# Patient Record
Sex: MALE | Race: OTHER
[De-identification: names, ages, dates, MRNs, and addresses within clinical notes are randomized per-mention and may not be internally consistent; named-entity substitution may affect disease eponyms.]

---

## 2018-07-19 ENCOUNTER — HOSPITAL ENCOUNTER (EMERGENCY)
Dept: HOSPITAL 74 - JER | Age: 53
Discharge: HOME | End: 2018-07-19
Payer: SELF-PAY

## 2018-07-19 VITALS — SYSTOLIC BLOOD PRESSURE: 104 MMHG | TEMPERATURE: 97.5 F | DIASTOLIC BLOOD PRESSURE: 73 MMHG | HEART RATE: 66 BPM

## 2018-07-19 VITALS — BODY MASS INDEX: 31.1 KG/M2

## 2018-07-19 DIAGNOSIS — R10.9: Primary | ICD-10-CM

## 2018-07-19 DIAGNOSIS — I10: ICD-10-CM

## 2018-07-19 LAB
ALBUMIN SERPL-MCNC: 3.7 G/DL (ref 3.4–5)
ALP SERPL-CCNC: 70 U/L (ref 45–117)
ALT SERPL-CCNC: 44 U/L (ref 12–78)
ANION GAP SERPL CALC-SCNC: 9 MMOL/L (ref 8–16)
APPEARANCE UR: CLEAR
AST SERPL-CCNC: 29 U/L (ref 15–37)
BASOPHILS # BLD: 0.6 % (ref 0–2)
BILIRUB SERPL-MCNC: 0.4 MG/DL (ref 0.2–1)
BILIRUB UR STRIP.AUTO-MCNC: NEGATIVE MG/DL
BUN SERPL-MCNC: 12 MG/DL (ref 7–18)
CALCIUM SERPL-MCNC: 8.7 MG/DL (ref 8.5–10.1)
CHLORIDE SERPL-SCNC: 106 MMOL/L (ref 98–107)
CO2 SERPL-SCNC: 25 MMOL/L (ref 21–32)
COLOR UR: (no result)
CREAT SERPL-MCNC: 1 MG/DL (ref 0.55–1.02)
DEPRECATED RDW RBC AUTO: 13.8 % (ref 11.6–15.6)
EOSINOPHIL # BLD: 1.2 % (ref 0–4.5)
GLUCOSE SERPL-MCNC: 85 MG/DL (ref 74–106)
HCT VFR BLD CALC: 46.3 % (ref 32.4–45.2)
HGB BLD-MCNC: 15.8 GM/DL (ref 10.7–15.3)
KETONES UR QL STRIP: NEGATIVE
LEUKOCYTE ESTERASE UR QL STRIP.AUTO: NEGATIVE
LIPASE SERPL-CCNC: 104 U/L (ref 73–393)
LYMPHOCYTES # BLD: 35.4 % (ref 8–40)
MCH RBC QN AUTO: 30 PG (ref 25.7–33.7)
MCHC RBC AUTO-ENTMCNC: 34.2 G/DL (ref 32–36)
MCV RBC: 87.7 FL (ref 80–96)
MONOCYTES # BLD AUTO: 18.5 % (ref 3.8–10.2)
NEUTROPHILS # BLD: 44.3 % (ref 42.8–82.8)
NITRITE UR QL STRIP: NEGATIVE
PH UR: 5 [PH] (ref 5–8)
PLATELET # BLD AUTO: 205 K/MM3 (ref 134–434)
PMV BLD: 8.5 FL (ref 7.5–11.1)
POTASSIUM SERPLBLD-SCNC: 4.1 MMOL/L (ref 3.5–5.1)
PROT SERPL-MCNC: 7.1 G/DL (ref 6.4–8.2)
PROT UR QL STRIP: NEGATIVE
PROT UR QL STRIP: NEGATIVE
RBC # BLD AUTO: 5.28 M/MM3 (ref 3.6–5.2)
SODIUM SERPL-SCNC: 140 MMOL/L (ref 136–145)
SP GR UR: 1.01 (ref 1–1.03)
UROBILINOGEN UR STRIP-MCNC: NEGATIVE MG/DL (ref 0.2–1)
WBC # BLD AUTO: 6.1 K/MM3 (ref 4–10)

## 2018-07-19 PROCEDURE — 3E033GC INTRODUCTION OF OTHER THERAPEUTIC SUBSTANCE INTO PERIPHERAL VEIN, PERCUTANEOUS APPROACH: ICD-10-PCS

## 2018-07-19 NOTE — PDOC
History of Present Illness





- General


Chief Complaint: Pain


Stated Complaint: RECTAL BLEED


Time Seen by Provider: 07/19/18 15:42





- History of Present Illness


Initial Comments: 


 52 year old male with history of hypertension and GERD presenting with 

abdominal pain, nausea, and multiple episode of diarrhea over the past three 

days. He believes this started with ingestion of some pork that tasted "off" on 

Tuesday night. Describes his abdominal pain as burning that starts in the 

center of his stomach and goes up to the bottom of his sternum, worse with food

, and better with laying down. He also states that he has had 5 episodes of 

diarrhea per day for the past three days. Admits to blood on toilet paper after 

wiping but no blood int he toilet itself. He has never had this abdominal pain 

before or noticed blood on the toilet paper before. Denies specific chest pain, 

SOB, vomiting, headache, weakness, lightheadedness, cough, or other symptoms.


07/19/18 16:52








Past History





- Past Medical History


Allergies/Adverse Reactions: 


 Allergies











Allergy/AdvReac Type Severity Reaction Status Date / Time


 


No Known Allergies Allergy   Verified 07/19/18 15:50











Home Medications: 


Ambulatory Orders





Famotidine [Pepcid -] 20 mg PO DAILY #30 tablet 07/19/18 


Mag Hydrox/Al Hydrox/Simeth [Mylanta Suspension -] 30 ml PO Q6H PRN #1 bottle 07 /19/18 








COPD: No


HTN: Yes





- Suicide/Smoking/Psychosocial Hx


Smoking History: Never smoked


Have you smoked in the past 12 months: No


Information on smoking cessation initiated: No


Hx Alcohol Use: No


Drug/Substance Use Hx: No


Substance Use Type: None





**Review of Systems





- Review of Systems


Constitutional: No: Chills, Diaphoresis, Fever


HEENTM: No: Blurred Vision, Tearing


Respiratory: No: Cough, Shortness of Breath, Wheezing


Cardiac (ROS): No: Chest Pain, Edema, Irregular Heart Rate


ABD/GI: Yes: Diarrhea, Nausea, Rectal Bleeding.  No: Blood Streaked Bowels, 

Poor Appetite, Vomiting


: No: Burning, Dysuria, Discharge


Musculoskeletal: No: Back Pain, Gout, Joint Pain


Integumentary: No: Flushing, Lesions, Lumps


Neurological: No: Headache, Numbness, Paresthesia


Psychiatric: No: Anxiety, Depression


Hematologic/Lymphatic: No: Anemia, Blood Clots, Easy Bleeding





*Physical Exam





- Vital Signs


 Last Vital Signs











Temp Pulse Resp BP Pulse Ox


 


 97.5 F L  66   18   104/73   99 


 


 07/19/18 15:50  07/19/18 15:50  07/19/18 15:50  07/19/18 15:50  07/19/18 15:50














- Physical Exam


General Appearance: Yes: Nourished, Appropriately Dressed.  No: Apparent 

Distress


HEENT: positive: EOMI, TWILA, Normal ENT Inspection, Normal Voice


Neck: positive: Trachea midline, Normal Thyroid, Supple.  negative: Tender, 

Rigid


Respiratory/Chest: positive: Lungs Clear, Normal Breath Sounds.  negative: 

Chest Tender, Respiratory Distress, Accessory Muscle Use


Cardiovascular: positive: Regular Rhythm, Regular Rate


Gastrointestinal/Abdominal: positive: Flat, Soft.  negative: Tender


Rectal Exam: positive: heme negative stool, normal rectal tone.  negative: 

normal exam (hemorrhoi at the 12 oclock ), melena, decreased tone


Musculoskeletal: positive: Normal Inspection.  negative: Decreased Range of 

Motion


Extremity: positive: Normal Capillary Refill, Normal Inspection, Normal Range 

of Motion.  negative: Tender


Integumentary: positive: Normal Color, Dry, Warm


Neurologic: positive: Fully Oriented, Alert, Normal Mood/Affect, Normal Response

, Motor Strength 5/5





ED Treatment Course





- LABORATORY


CBC & Chemistry Diagram: 


 07/19/18 17:05





 07/19/18 17:05





Medical Decision Making





- Medical Decision Making


52 year old male with PMH of HTN and GERD presenting with burning abdominal pain

, nausea, and diarrhea worsening with food. Patient overall healthy otherwise. 

Symptoms improved with Pepcid and Maalox which corroborates most likely 

diagnosis of GERD/ PUD. Labs WNL. Will DC patient with GI follow up for triple 

therapy vs. endoscopy.


07/19/18 18:14








*DC/Admit/Observation/Transfer


Diagnosis at time of Disposition: 


Abdominal pain


Qualifiers:


 Abdominal location: epigastric Qualified Code(s): R10.13 - Epigastric pain





Diarrhea


Qualifiers:


 Diarrhea type: unspecified type Qualified Code(s): R19.7 - Diarrhea, 

unspecified








- Discharge Dispostion


Disposition: HOME


Condition at time of disposition: Improved


Decision to Admit order: No





- Prescriptions


Prescriptions: 


Famotidine [Pepcid -] 20 mg PO DAILY #30 tablet


Mag Hydrox/Al Hydrox/Simeth [Mylanta Suspension -] 30 ml PO Q6H PRN #1 bottle


 PRN Reason: Dyspepsia





- Referrals


Referrals: 


José Hernandez MD [Staff Physician] - 





- Patient Instructions


Printed Discharge Instructions:  DI for Peptic Ulcer


Additional Instructions: 


Please avoid spicey, hot, and very cold foods. Please avoid alcohol. Please 

follow up with Dr. Hernandez for further evaluation of your stomach pain. Please 

return to the ED if you have new or worsening symptoms.





- Post Discharge Activity
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
## 2018-07-19 NOTE — PDOC
Attending Attestation





- HPI


HPI: 





07/19/18 18:20


52 year old male with no significant past medical history who presents to the 

ED complaining of 3 days of nausea and watery diarrhea. States he ate pork and 

a restaurant prior to the onset of his symptoms. He denies abdominal pain or 

vomiting. He denies any blood per rectum. He denies fever or chills. 





- Physicial Exam


PE: 





07/19/18 18:21


Constitutional: Awake, alert, oriented.  No acute distress.


Head:  Normocephalic.  Atraumatic


Eyes:  PERRL. EOMI.  Conjunctivae are not pale.


ENT:  Mucous membranes are moist and intact. Posterior pharynx without exudates 

or erythema. Uvula midline.


Cardiovascular:  Regular rate.  Regular rhythm. S1, S2 regular.  Distal pulses 

are 2+ and symmetric.  


Pulmonary/Chest:  No evidence of respiratory distress.  Clear to auscultation 

bilaterally  No wheezing, rales or rhonchi.


Abdominal:  Soft and non-distended.  There is no tenderness.  No rebound, 

guarding or rigidity.  No organomegaly. No palpable masses. Good bowel sounds.


Skin:  Skin is warm and dry. 


Neurological:  Alert and oriented to person, place, and time.  Cranial nerves II

-XII are grossly intact. Normal speech. 








Documentation prepared by Sharri Sherman, acting as medical scribe for Bria Donnelly DO.





<Sharri Sherman - Last Filed: 07/19/18 18:20>





- Resident


Resident Name: Enriqueta Quiroz





- ED Attending Attestation


I have performed the following: I have examined & evaluated the patient, The 

case was reviewed & discussed with the resident, I agree w/resident's findings 

& plan, Exceptions are as noted





- Medical Decision Making





07/19/18 16:31








I, Dr. Bria Donnelly DO, attest that this document has been prepared under 

my direction and personally reviewed by me in its entirety.   I further attest, 

that it accurately reflects all work, treatment, procedures and medical decision

-making performed by me.  


07/19/18 19:15


a/p: 51yo male with diarrhea x 3 days after eating port


-nonbloody


-nasuea


-epigastric pain


-will send labs, suspect gastritis/gastroenteritits


-will give gi cocktail


-will hydrate


-will monitor and reassess


-pt is nontoxic in appearance


07/19/18 19:17


after medication, pt feels better


stable for d/c to home


recommended stool cultures/o&p, pt unable to provide sample in the ED


family at bedside requests follow up with Dr. Shahzad Dickinson


will give Dr. Hernandez for gi follow up


answered all questions


pt feels better





<Bria Donnelly - Last Filed: 07/19/18 19:18>

## 2018-07-20 NOTE — EKG
Test Reason : 

Blood Pressure : ***/*** mmHG

Vent. Rate : 057 BPM     Atrial Rate : 057 BPM

   P-R Int : 184 ms          QRS Dur : 084 ms

    QT Int : 394 ms       P-R-T Axes : 039 026 020 degrees

   QTc Int : 383 ms

 

SINUS BRADYCARDIA

EARLY REPOLARIZATION

OTHERWISE NORMAL ECG

NO PREVIOUS ECGS AVAILABLE

Confirmed by MOMO NAILS, MISHA ([id number]) on 7/20/2018 12:12:10 PM

 

Referred By:             Confirmed By:MISHA BARR MD